# Patient Record
Sex: MALE | Race: WHITE
[De-identification: names, ages, dates, MRNs, and addresses within clinical notes are randomized per-mention and may not be internally consistent; named-entity substitution may affect disease eponyms.]

---

## 2022-01-07 ENCOUNTER — HOSPITAL ENCOUNTER (EMERGENCY)
Dept: HOSPITAL 56 - MW.ED | Age: 57
Discharge: NURSING, REHAB OR LONG-TERM CARE | End: 2022-01-07
Payer: SELF-PAY

## 2022-01-07 DIAGNOSIS — E87.2: ICD-10-CM

## 2022-01-07 DIAGNOSIS — N19: ICD-10-CM

## 2022-01-07 DIAGNOSIS — K92.2: Primary | ICD-10-CM

## 2022-01-07 DIAGNOSIS — R79.89: ICD-10-CM

## 2022-01-07 DIAGNOSIS — R57.9: ICD-10-CM

## 2022-01-07 DIAGNOSIS — Z20.822: ICD-10-CM

## 2022-01-07 LAB
APAP SERPL-MCNC: <2 UG/ML
BUN SERPL-MCNC: 151 MG/DL (ref 7–18)
CHLORIDE SERPL-SCNC: 96 MMOL/L (ref 98–107)
CO2 SERPL-SCNC: 6.4 MMOL/L (ref 21–32)
FLUAV RNA UPPER RESP QL NAA+PROBE: NEGATIVE
FLUBV RNA UPPER RESP QL NAA+PROBE: NEGATIVE
GLUCOSE SERPL-MCNC: 137 MG/DL (ref 74–106)
POTASSIUM SERPL-SCNC: 7.7 MMOL/L (ref 3.5–5.1)
SARS-COV-2 RNA RESP QL NAA+PROBE: NEGATIVE
SODIUM SERPL-SCNC: 130 MMOL/L (ref 136–148)

## 2022-01-07 PROCEDURE — 85610 PROTHROMBIN TIME: CPT

## 2022-01-07 PROCEDURE — 85730 THROMBOPLASTIN TIME PARTIAL: CPT

## 2022-01-07 PROCEDURE — 99292 CRITICAL CARE ADDL 30 MIN: CPT

## 2022-01-07 PROCEDURE — 87040 BLOOD CULTURE FOR BACTERIA: CPT

## 2022-01-07 PROCEDURE — 86900 BLOOD TYPING SEROLOGIC ABO: CPT

## 2022-01-07 PROCEDURE — 81001 URINALYSIS AUTO W/SCOPE: CPT

## 2022-01-07 PROCEDURE — 96375 TX/PRO/DX INJ NEW DRUG ADDON: CPT

## 2022-01-07 PROCEDURE — 84132 ASSAY OF SERUM POTASSIUM: CPT

## 2022-01-07 PROCEDURE — 99291 CRITICAL CARE FIRST HOUR: CPT

## 2022-01-07 PROCEDURE — 80143 DRUG ASSAY ACETAMINOPHEN: CPT

## 2022-01-07 PROCEDURE — 36430 TRANSFUSION BLD/BLD COMPNT: CPT

## 2022-01-07 PROCEDURE — 86920 COMPATIBILITY TEST SPIN: CPT

## 2022-01-07 PROCEDURE — 83605 ASSAY OF LACTIC ACID: CPT

## 2022-01-07 PROCEDURE — 96368 THER/DIAG CONCURRENT INF: CPT

## 2022-01-07 PROCEDURE — 93005 ELECTROCARDIOGRAM TRACING: CPT

## 2022-01-07 PROCEDURE — 31500 INSERT EMERGENCY AIRWAY: CPT

## 2022-01-07 PROCEDURE — P9017 PLASMA 1 DONOR FRZ W/IN 8 HR: HCPCS

## 2022-01-07 PROCEDURE — 82803 BLOOD GASES ANY COMBINATION: CPT

## 2022-01-07 PROCEDURE — 36415 COLL VENOUS BLD VENIPUNCTURE: CPT

## 2022-01-07 PROCEDURE — 80307 DRUG TEST PRSMV CHEM ANLYZR: CPT

## 2022-01-07 PROCEDURE — 84484 ASSAY OF TROPONIN QUANT: CPT

## 2022-01-07 PROCEDURE — 96365 THER/PROPH/DIAG IV INF INIT: CPT

## 2022-01-07 PROCEDURE — 85014 HEMATOCRIT: CPT

## 2022-01-07 PROCEDURE — 80305 DRUG TEST PRSMV DIR OPT OBS: CPT

## 2022-01-07 PROCEDURE — 80053 COMPREHEN METABOLIC PANEL: CPT

## 2022-01-07 PROCEDURE — 0240U: CPT

## 2022-01-07 PROCEDURE — P9016 RBC LEUKOCYTES REDUCED: HCPCS

## 2022-01-07 PROCEDURE — 83735 ASSAY OF MAGNESIUM: CPT

## 2022-01-07 PROCEDURE — 85018 HEMOGLOBIN: CPT

## 2022-01-07 PROCEDURE — 86901 BLOOD TYPING SEROLOGIC RH(D): CPT

## 2022-01-07 PROCEDURE — 84443 ASSAY THYROID STIM HORMONE: CPT

## 2022-01-07 PROCEDURE — 86850 RBC ANTIBODY SCREEN: CPT

## 2022-01-07 PROCEDURE — 96366 THER/PROPH/DIAG IV INF ADDON: CPT

## 2022-01-07 PROCEDURE — C9113 INJ PANTOPRAZOLE SODIUM, VIA: HCPCS

## 2022-01-07 PROCEDURE — 80179 DRUG ASSAY SALICYLATE: CPT

## 2022-01-07 PROCEDURE — 36556 INSERT NON-TUNNEL CV CATH: CPT

## 2022-01-07 PROCEDURE — 96367 TX/PROPH/DG ADDL SEQ IV INF: CPT

## 2022-01-07 PROCEDURE — 82947 ASSAY GLUCOSE BLOOD QUANT: CPT

## 2022-01-07 PROCEDURE — 96376 TX/PRO/DX INJ SAME DRUG ADON: CPT

## 2022-01-07 PROCEDURE — 85025 COMPLETE CBC W/AUTO DIFF WBC: CPT

## 2022-01-07 PROCEDURE — 71045 X-RAY EXAM CHEST 1 VIEW: CPT

## 2022-01-07 PROCEDURE — 36600 WITHDRAWAL OF ARTERIAL BLOOD: CPT

## 2022-01-07 RX ADMIN — SUCCINYLCHOLINE CHLORIDE STA MG: 20 INJECTION, SOLUTION INTRAMUSCULAR; INTRAVENOUS at 17:15

## 2022-01-07 RX ADMIN — OCTREOTIDE ACETATE ONE MCG: 50 INJECTION, SOLUTION INTRAVENOUS; SUBCUTANEOUS at 18:35

## 2022-01-07 RX ADMIN — OCTREOTIDE ACETATE ONE: 50 INJECTION, SOLUTION INTRAVENOUS; SUBCUTANEOUS at 18:26

## 2022-01-07 RX ADMIN — SUCCINYLCHOLINE CHLORIDE STA MG: 20 INJECTION, SOLUTION INTRAMUSCULAR; INTRAVENOUS at 20:58

## 2022-01-07 NOTE — CR
INDICATION: Flight crew wanted intubation checked



TECHNIQUE: Chest radiograph 1 view



COMPARISON: 01/07/2022



FINDINGS: 



Mediastinum: The mediastinum is normal in appearance. The heart silhouette 

is normal in size and morphology. The endotracheal tube tip is positioned 

6.8 cm from the dioni. NG tube is present with the tip in the gastric 

fundus. 



Lung: Consolidation in the right lower lung zone and small right pleural 

effusion is present without significant change. No pneumothorax is 

identified. 



Bone and Soft tissue: Unremarkable for age. 



IMPRESSION: 



1. There has been no significant interval changes.



Dictated by Dwight Genao MD @ 1/7/2022 10:30:58 PM



Dictated by: Dwight Genao MD @ 01/07/2022 22:31:01



(Electronically Signed)

## 2022-01-07 NOTE — EDM.PDOC
<ErrolrobertyvonneJitendra A - Last Filed: 01/07/22 18:45>





ED HPI GENERAL MEDICAL PROBLEM





- General


Chief Complaint: General


Stated Complaint: EMS ARRIVAL


Time Seen by Provider: 01/07/22 17:04


Source of Information: Reports: EMS


History Limitations: Reports: Altered Mental Status





- History of Present Illness


INITIAL COMMENTS - FREE TEXT/NARRATIVE: 





56-year-old male past medical history alcohol abuse presents for unresponsive 

state.  History is from EMS as patient is unable to provide history secondary to

clinical condition.  Someone had called a welfare check on the patient and when 

police arrived at the patient's apartment he was noted to be lying on the ground

unresponsive.  EMS was called and did do an EKG which showed a normal sinus 

rhythm as well as a fingerstick glucose which was in normal limits.  Patient was

noted to have normal blood pressure and pulse.  He was with agonal respirations 

and pinpoint pupils.  2 mg of Narcan was given IV without any response.  Patient

brought to the emergency department for further work-up and assessment.





- Related Data


                                    Allergies











Allergy/AdvReac Type Severity Reaction Status Date / Time


 


No Known Allergies Allergy   Verified 01/07/22 18:54











Home Meds: 


                                    Home Meds





. [No Known Home Meds]  08/19/15 [History]











Past Medical History


Other Psychiatric History: Etoh abuse/DT's.





ED ROS GENERAL





- Review of Systems


Review Of Systems: Unable To Obtain


Reason Not Obtained: Unresponsive state





ED EXAM, GENERAL





- Physical Exam


Exam: See Below


Exam Limited By: Other (Unresponsive state)


General Appearance: Other (GCS 6, withdrawals to pain, otherwise non-verbal, 

does not follow commands, appears chronically ill)


Eye Exam: Bilateral Eye: Other (pupils b/l pinpoint)


Ears: Normal External Exam


Throat/Mouth: No Airway Compromise, Other (dry oropharynx)


Head: Atraumatic, Normocephalic


Neck: Normal Inspection


Respiratory/Chest: Other (agonal breathing, otherwise lungs CTAB)


Cardiovascular: Other (difficult to palpate radial pulses b/l; palpable femoral 

pulses)


GI/Abdominal: Soft.  No: Guarding


Extremities: Normal Inspection


Neurological: Other (GCS6)


Skin Exam: Dry, Intact, Cool





ED GENERAL MEDICAL PROCEDURES





- Endotracheal Intubation


Time of Intubation: 17:00


ET Intubation Indication: Airway Protection


Preparation: Suction, Balloon Tested, BVM Set Up, Difficult Airway Equip


Pre-Oxygenation: Assisted with BVM


Anesthesia Meds: Etomidate, Succinylcholine


Placement: Orotracheal


Cords Visualized: Yes


ETT Size In mm: 7.5


Number of Attempts: 2


Confirmed By: CO2 Indicator, Bilateral Breath Sounds, Chest Xray


Tube Secured By: By RT


Endotracheal Intubation Comment: 





Intubation was complicated by very dry oropharynx.


  ** #1 Interpretation


EKG Date: 01/07/22


Time: 16:50


Rhythm: Other (junctional rhythm)


Axis: Normal


P-Wave: Absent


QRS: Normal


ST-T: Normal


QT: Prolonged (517)


EKG Interpretation Comments: 





no acute ischemic changes





Course





- Re-Assessments/Exams


Free Text/Narrative Re-Assessment/Exam: 





01/07/22 17:45


Patient presents unresponsive.  He does have history of alcohol abuse and 

alcohol withdrawal seizures.  Patient is intubated for airway protection.  

Propofol drip has been ordered.  We'll follow up extensive labs and imaging.


01/07/22 18:23


I did get a phone call from the patient's wife Ms. Nina Pena 

([phone]); she notes that she has not seen the patient in several days.  

She believes that the patient has been sick for the last couple of days and 

notes that he was taking time off from work.  She states the patient's employer 

has been bringing him food for the past few days.  She believes that the last 

time that he was seen was yesterday when patient employer brought him food.  The

 patient's employer could not get a hold of him today prompting him to call 

police for a welfare check leading to him being brought to the hospital this 

afternoon.  The patient's wife would like to be called with updates.  She has 

been informed the patient is very ill and currently intubated.


01/07/22 18:36


OG tube was placed with aspiration of roughly 500cc of dark red blood. Patient's

 ABG show severe metabolic acidosis; sodium bicarbonate push and drip ordered. 

Ceftriaxone, octreotide, protonix ordered. 2-L IVFB ordered. Type and screen 

ordered.  


01/07/22 18:45


I have called [facility] and [facility] which are both on full-

house diversion. I am on hold with [facility]. 





Departure





- Departure


Disposition: DC/Tfer to [facility]


Clinical Impression: 


 Shock, GI bleed, Renal failure








- Discharge Information


Referrals: 


PCP,None [Primary Care Provider] - 


Forms:  ED Department Discharge





<RustamCuong C - Last Filed: 01/07/22 22:21>





ED HPI GENERAL MEDICAL PROBLEM





- History of Present Illness


INITIAL COMMENTS - FREE TEXT/NARRATIVE: 


Patient was signed out to me by Dr. Chapman pending further work-up and final 

disposition at 7pm. I promptly performed a detailed physical examination and my 

examination was done after ED treatments were initiated by the signout provider.

Patient has been under the care of previous provider up until this point. 





In short this is a 56-year-old man with a past medical history of alcohol use 

disorder and prior history of alcohol withdrawal seizures who was found agonal 

he breathing at his home after a welfare check.  Upon arrival in the emergency 

department the patient was intubated secondary to decreased GCS and for airway 

protection.  When OG tube was placed approximately 500 cc of coffee-ground 

emesis/bloody emesis was removed.  It was difficult for them to obtain any kind 

of pulse oximetry so they obtained an ABG which did reveal metabolic acidosis 

with bicarbonate of 5 and a pH of 6.98.  PaO2 at that time was 472 therefore 

oxygen was weaned down.








EXAMINATION OF ORGAN SYSTEMS/BODY AREAS: 





Constitutional: Hypotensive, bradycardiac.  Pulse oximetry cannot be obtained


General: Middle-aged man who appears pale and is intubated and sedated.


Eyes: No scleral icterus or conjunctival erythema pale conjunctiva. Pupils were 

2mm bilaterally. 


ENMT: Dry mucous membranes with OG and endotracheal tube in place.


Cardiovascular: Bradycardic no gallops, murmurs, or rubs.  Central pulses could 

be palpated.  They were strong


Respiratory: Lungs clear to auscultation bilaterally.  No wheezes, rales, or 

rhonchi.  Ventilated.


Gastrointestinal: Soft, non-tender, non-distended.  Normoactive bowel sounds


Genitourinary: Mistry catheter in place.  No urinary output musculoskeletal: 

Normal range of motion.


Skin: The skin is pale with peripheral cyanosis


Neurological:     Difficult to assess secondary to intubation and sedation





The patient has already been provided 2 L of normal saline, 80 mg of IV 

Protonix, and started on a sodium bicarb drip.  The patient's MAP is less than 

65.  Therefore at this time given shock will place a central line emergently.  

Given the amount of bleeding from OG tube I did order 3 units of PRBCs, 1 of FFP

, and provided the patient with 1 g of TXA.  In addition at this time it is 

uncertain if the patient has a gastritis versus esophageal varices therefore we 

will start the patient on an octreotide drip.  Consent was implied emergent.





Central Line Procedure 





The risks and benefits of the procedure were explained. Consent was implied 

emergent. The indication for central line placement was hypotension/need for 

vasopressors. The patient was monitored during the entire procedure. Time out 

was performed. The patient's left groin was prepped and draped in a sterile 

fashion. Lidocaine was used to anesthetize the area. A needle was entered into 

the femoral vein under landmark palpation with return of non-pulsatile flow. 

Guidewire was placed and triple lumen catheter was placed via Seldinger techni

que. Guidewire was removed. All 3 lines easily withdrew blood and were flushed 

with sterile saline. A Biopatch was placed and the line was secured. Patient 

tolerated procedure well. No complications.





After placement of central venous catheter I did order norepinephrine to start 

to keep maps above 65.





Laboratory: CBC reveals a macrocytic anemia with a hemoglobin of 4.0 and 

hematocrit of 13.9.  There is a leukocytosis with a white blood cell count of 

16.2 with 15.7 segmented neutrophils.  INR is mildly elevated at 1.62 and APTT 

is 37.9.  CMP reveals anion gap metabolic acidosis with a bicarbonate of 6.4, 

hyponatremia at 130, hypochloremia at 96, hyperkalemia without hemolysis at 7.7.

 Acute renal failure with a BUN of 151 and a creatinine of 9.3.  Hyperglycemia 

at 137.  There is a transaminitis with an AST of 262, ALT of 204 and alkaline 

phosphatase of 415.  Hypoalbuminemia at 1.8.  TSH is 5.61.  Troponin is elevated

0.447.  Urinalysis was negative.  UDS and serum drug screen were negative.  

Covid and influenza are negative.





Given the acute renal failure and hyperkalemia I did obtain a repeat EKG.  There

was no evidence of any peaked T waves however there was QRS widening.  Therefore

at this time we will provide the patient with calcium gluconate, insulin, 

dextrose, albuterol, and Kayexalate.  We will reevaluate after this treatment.  

The patient is already on bicarbonate drip.  In addition Lasix is not indicated 

at this time given the patient has had 0 urinary output since being in the 

emergency department.  We will start the patient on lactated Ringer's at 150 

cc/h for maintenance fluids.  In addition given the mild elevation in INR we did

provide the patient with vitamin K 10 mg IV.





At this time I did contact the patient's wife and discussed CODE STATUS with her

and an update on the patient.  At this time the patient is full code.  I did 

discuss that I would update her on where the patient will be transferred to.





We contacted [facility] in [place], [facility] in [place], [facility], [facility], [facility], [facility], [facility] all

of which do not have any capability.  Therefore at this time we did speak to 

[facility] and I spoke with Dr. Tyson who accepted the patient for 

transfer.





At the time of transfer the patient's vital signs had improved and the patient 

no longer required any further norepinephrine dosing.  The patient's map was 

greater than 65, and pulse oximetry with good waveform was 93 to 95% on FiO2 of 

40%.  Given the hyperkalemia we did repeat an EKG which continued to show a 

widened QRS however there was no longer bradycardia.  Therefore we provided an 

additional dose of insulin and glucose, albuterol, and calcium gluconate.  We 

did obtain repeat labs prior to discharge and transfer with a hemoglobin showing

increased to 6.3 after 2 units and a potassium that is decreased down to 7.0.





DISPOSITION: The patient was transferred to [facility] in critical 

condition 





CONDITION: Critical





PROCEDURES: Cardiac monitoring interpretation, pulse oximetry interpretation, 

central line placement





FINAL IMPRESSION(S)/DIAGNOSES: 





1.  Acute hemorrhagic versus septic shock


2.  Acute anion gap metabolic acidosis likely secondary to #1


3.  Acute renal failure likely secondary to #1


4.  Acute elevated troponin likely secondary to type II demand ischemia 

secondary to #1 and #3


5.  Acute GI bleed likely secondary to upper GI bleed versus esophageal variceal

bleeding





Critical Care Procedure Note





Authorized and performed by: Cuong Easton M.D. 


Critical Care Time: 180 minutes


Due to a high probability of clinically significant, life threatening 

deterioration, the patient required my highest level of preparedness to 

intervene emergently and I personally spent this critical care time directly and

personally managing the patient. This critical care time included obtaining a 

history, examining the patient, pulse oximetry; ordering and review of studies; 

arranging urgent treatment with development of a management plan; evaluation of 

a patients reponse to treatment; frequent assessment; and discussions with other

providers. This critical care time was performed to assess and manage the high 

probability of imminent, life threatening deterioration that could result in 

multiorgan failure. It was exclusive of separate billable procedures and 

treating other patients. 





Please see MDM section and rest of the note for further information on patient 

assessment and treatment.





Please see MDM section and rest of the note for further information on patient 

assessment and treatment.





 





Cuong Easton M.D.








Course





- Vital Signs


Last Recorded V/S: 





                                Last Vital Signs











Temp  36.2 C   01/07/22 18:49


 


Pulse  61   01/07/22 18:49


 


Resp  26 H  01/07/22 18:49


 


BP  98/47 L  01/07/22 18:49


 


Pulse Ox  81 L  01/07/22 18:49














- Orders/Labs/Meds


Orders: 





                               Active Orders 24 hr











 Category Date Time Status


 


 Cardiac Monitoring [RC] .AS DIRECTED Care  01/07/22 17:04 Active


 


 Insert Mistry Catheter [Insert Urinary Catheter] [OM.PC] Care  01/07/22 17:45 

Ordered





 Q24H   


 


 RASS Sedation Scale [RC] ASDIRECTED Care  01/07/22 17:33 Active


 


 RASS Sedation Scale [RC] ASDIRECTED Care  01/07/22 17:37 Active


 


 RT Aerosol Therapy [RC] ASDIRECTED Care  01/07/22 20:01 Active


 


 RT Aerosol Therapy [RC] ASDIRECTED Care  01/07/22 21:47 Active


 


 Urinary Catheter Assessment [RC] ASDIRECTED Care  01/07/22 17:31 Active


 


 Ventilator Assessment, ED [RT Ventilator ED, Adult] [RC Care  01/07/22 17:15 

Active





 ] ASDIRECTED   


 


 Chest 1V Frontal [CR] Stat Exams  01/07/22 22:06 Ordered


 


 CULTURE BLOOD [BC] Stat Lab  01/07/22 18:38 Results


 


 CULTURE BLOOD [BC] Stat Lab  01/07/22 18:50 Results


 


 FRESH FROZEN PLASMA [BBK] Stat Lab  01/07/22 18:55 Results


 


 RED BLOOD CELLS LP [BBK] Stat Lab  01/07/22 18:55 Results


 


 REFLEX LACTIC ACID YES OR NO [CHEM] Routine Lab  01/07/22 19:55 Received


 


 TYPE AND SCREEN [BBK] Stat Lab  01/07/22 18:55 Results


 


 VANCOMYCIN TROUGH [CHEM] Timed Lab  01/08/22 21:00 Ordered


 


 Dextrose 50% in Water Med  01/07/22 20:00 Active





 50 ml IV ASDIRECTED PRN   


 


 Dextrose 50% in Water Med  01/07/22 20:01 Active





 50 ml IVPUSH ASDIRECTED PRN   


 


 Dextrose 50% in Water Med  01/07/22 21:55 Active





 50 ml IVPUSH ASDIRECTED PRN   


 


 Glucagon,Human Recombinant [GlucaGen] Med  01/07/22 20:01 Active





 1 mg IM ASDIRECTED PRN   


 


 Glucagon,Human Recombinant [GlucaGen] Med  01/07/22 21:55 Active





 1 mg IM ASDIRECTED PRN   


 


 Lactated Ringers [Ringers, Lactated] 1,000 ml Med  01/07/22 21:18 Active





 IV STAT   


 


 Norepinephrine Bit/0.9 % NaCl [Norepinephr-0.9% NaCl 4 Med  01/07/22 20:00 

Active





 mg/250]   





 4 mg in 250 ml IV TITRATE   


 


 Octreotide [SandoSTATIN] 500 mcg Med  01/07/22 19:15 Active





 Sodium Chloride 0.9% [Normal Saline] 495 ml   





 IV Q10H   


 


 Pharmacy to Dose - Vancomycin Med  01/07/22 20:04 Pending





 1 dose .XX ONETIME ONE   


 


 Sodium Bicarbonate [Sodium Bicarbonate 8.4%] 150 meq Med  01/07/22 19:30 Active





 Dextrose 5% in Water 1,000 ml   





 IV ONETIME   


 


 Sodium Chloride 0.9% [Saline Flush] Med  01/07/22 17:04 Active





 10 ml FLUSH ASDIRECTED PRN   


 


 Sodium Chloride 0.9% [Saline Flush] Med  01/07/22 17:04 Active





 2.5 ml FLUSH ASDIRECTED PRN   


 


 VANCOmycin 2 GM/400 ML 2 gm Med  01/07/22 21:00 Active





 Premix Bag 1 bag   





 IV STAT   


 


 fentaNYL/Normal Saline [fentaNYL 2500 MCG in  ML Med  01/07/22 19:10 

Active





 (10 MCG/ML)] 2,500 mcg   





 Premix Bag 1 bag   





 IV TITRATE   


 


 propofoL [Diprivan 100 ML] 100 ml Med  01/07/22 17:45 Active





 IV TITRATE   


 


 Blood Culture x2 Reflex Set [OM.PC] Stat Oth  01/07/22 17:21 Ordered


 


 Desired Level of Sedation (RASS) [AST] Click to Edit [facility]  01/07/22 17:33 

Ordered


 


 Desired Level of Sedation (RASS) [AST] Click to Edit [facility]  01/07/22 17:37 

Ordered


 


 Saline Lock Insert [OM.PC] Stat Oth  01/07/22 17:04 Ordered


 


 Transfuse Fresh Frozen Plasma [COMM] Stat [facility]  01/07/22 19:53 Ordered


 


 Transfuse Red Blood Cells [COMM] Stat [facility]  01/07/22 19:12 Ordered


 


 Transfuse Red Blood Cells [COMM] Stat [facility]  01/07/22 19:57 Ordered








                                Medication Orders





Dextrose/Water (50% Dextrose In Water 50 Ml Syringe)  50 ml IV ASDIRECTED PRN


   PRN Reason: Hypoglycemia


Dextrose/Water (50% Dextrose In Water 50 Ml Syringe)  50 ml IVPUSH ASDIRECTED 

PRN


   PRN Reason: Hypoglycemia


Dextrose/Water (50% Dextrose In Water 50 Ml Syringe)  50 ml IVPUSH ASDIRECTED 

PRN


   PRN Reason: Hypoglycemia


Glucagon (Glucagon,Human Recombinant 1 Mg Vial)  1 mg IM ASDIRECTED PRN


   PRN Reason: Hypoglycemia


Glucagon (Glucagon,Human Recombinant 1 Mg Vial)  1 mg IM ASDIRECTED PRN


   PRN Reason: Hypoglycemia


Propofol (Diprivan 100 Ml)  100 mls @ 2.4 mls/hr IV TITRATE [facility]; Protocol


   Last Titration: 01/07/22 17:50  Dose: 20 mcg/kg/min, 9.6 mls/hr


   Documented by: RODRIGO


   Admin: 01/07/22 17:45  Dose: 5 mcg/kg/min, 2.4 mls/hr


   Documented by: MELODY


Octreotide Acetate 500 mcg/ (Sodium Chloride)  500 mls @ 50 mls/hr IV Q10H [facility]IVE


   Last Admin: 01/07/22 21:39  Dose: 50 mcg/hr, 50 mls/hr


   Documented by: STRUALLEANN


Fentanyl Citrate 2,500 mcg/ (Premix)  250 mls @ 2.5 mls/hr IV TITRATE PRN; 

Protocol


   PRN Reason: Other


Sodium Bicarbonate 150 meq/ (Dextrose/Water)  1,150 mls @ 200 mls/hr IV ONETIME 

ONE


   Stop: 01/08/22 00:21


   Last Admin: 01/07/22 20:20  Dose: 200 mls/hr


   Documented by: RODRIGO


Norepinephrine Bitartrate (Norepinephr-0.9% Nacl 4 Mg/250)  4 mg in 250 mls @ 

7.5 mls/hr IV TITRATE CLIVE; Protocol


Vancomycin HCl 2 gm/ Premix  400 mls @ 200 mls/hr IV STAT ONE


   Stop: 01/07/22 22:59


Lactated Ringer's (Ringers, Lactated)  1,000 mls @ 150 mls/hr IV STAT STA


   Stop: 01/08/22 03:57


   Last Admin: 01/07/22 22:05  Dose: 150 mls/hr


   Documented by: RODRIGO


Sodium Chloride (Sodium Chloride 0.9% 10 Ml Syringe)  10 ml FLUSH ASDIRECTED PRN


   PRN Reason: Keep Vein Open


   Last Admin: 01/07/22 19:03  Dose: 10 ml


   Documented by: RODRIGO


Sodium Chloride (Sodium Chloride 0.9% 2.5 Ml Syringe)  2.5 ml FLUSH ASDIRECTED 

PRN


   PRN Reason: Keep Vein Open


   Last Admin: 01/07/22 19:03  Dose: 2.5 ml


   Documented by: RODRIGO


Vancomycin HCl (Pharmacy To Dose - Vancomycin)  1 dose .XX ONETIME ONE


   Stop: 01/07/22 20:05








Labs: 





                                Laboratory Tests











  01/07/22 01/07/22 01/07/22 Range/Units





  17:33 17:33 18:05 


 


WBC     (4.0-11.0)  K/uL


 


RBC     (4.50-5.90)  M/uL


 


Hgb     (13.0-17.0)  g/dL


 


Hct     (38.0-50.0)  %


 


MCV     (80.0-98.0)  fL


 


MCH     (27.0-32.0)  pg


 


MCHC     (31.0-37.0)  g/dL


 


RDW Std Deviation     (28.0-62.0)  fl


 


RDW Coeff of Gabriela     (11.0-15.0)  %


 


Plt Count     (150-400)  K/uL


 


MPV     (7.40-12.00)  fL


 


Add Manual Diff     


 


Neutrophils % (Manual)     (48.0-80.0)  %


 


Lymphocytes % (Manual)     (16.0-40.0)  %


 


Monocytes % (Manual)     (0.0-15.0)  %


 


Nucleated RBC %     /100WBC


 


Absolute Seg Neuts     (1.4-5.7)  


 


Lymphocytes # (Manual)     (0.6-2.4)  


 


Monocytes # (Manual)     (0.0-0.8)  


 


Nucleated RBCs     %


 


Nucleated RBCs #     K/uL


 


INR     


 


APTT     (18.6-31.3)  SEC


 


ABG pH     (7.35-7.45)  


 


ABG pCO2     (35-45)  mmHG


 


ABG pO2     ()  mmHG


 


ABG HCO3     (22-26)  mEq/L


 


ABG Total CO2     (23-27)  mmol/L


 


ABG Base Excess     (-2.0-3.0)  


 


Sodium     (136-148)  mmol/L


 


Potassium     (3.5-5.1)  mmol/L


 


Chloride     ()  mmol/L


 


Carbon Dioxide     (21.0-32.0)  mmol/L


 


BUN     (7.0-18.0)  mg/dL


 


Creatinine     (0.8-1.3)  mg/dL


 


Est Cr Clr Drug Dosing     


 


Estimated GFR (MDRD)     ml/min


 


Glucose     ()  mg/dL


 


POC Glucose     (70-99)  mg/dL


 


Lactic Acid     (0.4-2.0)  mmol/L


 


Calcium     (8.5-10.1)  mg/dL


 


Magnesium     (1.8-2.4)  mg/dL


 


Total Bilirubin     (0.2-1.0)  mg/dL


 


AST     (15-37)  IU/L


 


ALT     (14-63)  IU/L


 


Alkaline Phosphatase     ()  U/L


 


Troponin I     (0.000-0.056)  ng/mL


 


Total Protein     (6.4-8.2)  g/dL


 


Albumin     (3.4-5.0)  g/dL


 


Globulin     (2.6-4.0)  g/dL


 


Albumin/Globulin Ratio     (0.9-1.6)  


 


TSH, Ultra Sensitive     (0.36-3.74)  uIU/mL


 


Urine Color  YELLOW    


 


Urine Appearance  CLEAR    


 


Urine pH  5.5    (5.0-8.0)  


 


Ur Specific Gravity  1.025    (1.001-1.035)  


 


Urine Protein  30 H    (NEGATIVE)  mg/dL


 


Urine Glucose (UA)  NEGATIVE    (NEGATIVE)  mg/dL


 


Urine Ketones  NEGATIVE    (NEGATIVE)  mg/dL


 


Urine Occult Blood  NEGATIVE    (NEGATIVE)  


 


Urine Nitrite  NEGATIVE    (NEGATIVE)  


 


Urine Bilirubin  NEGATIVE    (NEGATIVE)  


 


Urine Urobilinogen  0.2    (<2.0)  EU/dL


 


Ur Leukocyte Esterase  NEGATIVE    (NEGATIVE)  


 


Urine RBC  0-2    (0-2/HPF)  


 


Urine WBC  0-2    (0-5/HPF)  


 


Ur Epithelial Cells  FEW    (NONE-FEW)  


 


Urine Bacteria  RARE    (NEGATIVE)  


 


Salicylates     (0-20)  mg/dL


 


Urine Opiates Screen   NEGATIVE   (NEGATIVE)  


 


Ur Oxycodone Screen   NEGATIVE   (NEGATIVE)  


 


Urine Methadone Screen   NEGATIVE   (NEGATIVE)  


 


Acetaminophen     ug/mL


 


Ur Barbiturates Screen   NEGATIVE   (NEGATIVE)  


 


Ur Phencyclidine Scrn   NEGATIVE   (NEGATIVE)  


 


Ur Amphetamine Screen   NEGATIVE   (NEGATIVE)  


 


U Methamphetamines Scrn   NEGATIVE   (NEGATIVE)  


 


U Benzodiazepines Scrn   NEGATIVE   (NEGATIVE)  


 


U Cocaine Metab Screen   NEGATIVE   (NEGATIVE)  


 


U Marijuana (THC) Screen   NEGATIVE   (NEGATIVE)  


 


Ethyl Alcohol     mg/dL


 


Influenza Type A RNA    NEGATIVE  (NEGATIVE)  


 


Influenza Type B RNA    NEGATIVE  (NEGATIVE)  


 


SARS-CoV-2 RNA (GABRIEL)    NEGATIVE  (NEGATIVE)  


 


Blood Type     


 


Antibody Screen     


 


Crossmatch     














  01/07/22 01/07/22 01/07/22 Range/Units





  18:11 18:38 18:38 


 


WBC   16.70 H   (4.0-11.0)  K/uL


 


RBC   1.38 L   (4.50-5.90)  M/uL


 


Hgb   4.0 L*   (13.0-17.0)  g/dL


 


Hct   13.9 L   (38.0-50.0)  %


 


MCV   100.7 H   (80.0-98.0)  fL


 


MCH   29.0   (27.0-32.0)  pg


 


MCHC   28.8 L   (31.0-37.0)  g/dL


 


RDW Std Deviation   69.6 H   (28.0-62.0)  fl


 


RDW Coeff of Gabriela   20 H   (11.0-15.0)  %


 


Plt Count   229   (150-400)  K/uL


 


MPV   11.00   (7.40-12.00)  fL


 


Add Manual Diff   YES   


 


Neutrophils % (Manual)   94 H   (48.0-80.0)  %


 


Lymphocytes % (Manual)   5 L   (16.0-40.0)  %


 


Monocytes % (Manual)   1   (0.0-15.0)  %


 


Nucleated RBC %   6.4   /100WBC


 


Absolute Seg Neuts   15.7 H   (1.4-5.7)  


 


Lymphocytes # (Manual)   0.8   (0.6-2.4)  


 


Monocytes # (Manual)   0.2   (0.0-0.8)  


 


Nucleated RBCs   10   %


 


Nucleated RBCs #   1   K/uL


 


INR     


 


APTT     (18.6-31.3)  SEC


 


ABG pH  6.98 L*    (7.35-7.45)  


 


ABG pCO2  19 L    (35-45)  mmHG


 


ABG pO2  472 H    ()  mmHG


 


ABG HCO3  5 L    (22-26)  mEq/L


 


ABG Total CO2  6 L    (23-27)  mmol/L


 


ABG Base Excess  -24.5 L    (-2.0-3.0)  


 


Sodium     (136-148)  mmol/L


 


Potassium     (3.5-5.1)  mmol/L


 


Chloride     ()  mmol/L


 


Carbon Dioxide     (21.0-32.0)  mmol/L


 


BUN     (7.0-18.0)  mg/dL


 


Creatinine     (0.8-1.3)  mg/dL


 


Est Cr Clr Drug Dosing     


 


Estimated GFR (MDRD)     ml/min


 


Glucose     ()  mg/dL


 


POC Glucose     (70-99)  mg/dL


 


Lactic Acid    13.4 H*  (0.4-2.0)  mmol/L


 


Calcium     (8.5-10.1)  mg/dL


 


Magnesium     (1.8-2.4)  mg/dL


 


Total Bilirubin     (0.2-1.0)  mg/dL


 


AST     (15-37)  IU/L


 


ALT     (14-63)  IU/L


 


Alkaline Phosphatase     ()  U/L


 


Troponin I     (0.000-0.056)  ng/mL


 


Total Protein     (6.4-8.2)  g/dL


 


Albumin     (3.4-5.0)  g/dL


 


Globulin     (2.6-4.0)  g/dL


 


Albumin/Globulin Ratio     (0.9-1.6)  


 


TSH, Ultra Sensitive     (0.36-3.74)  uIU/mL


 


Urine Color     


 


Urine Appearance     


 


Urine pH     (5.0-8.0)  


 


Ur Specific Gravity     (1.001-1.035)  


 


Urine Protein     (NEGATIVE)  mg/dL


 


Urine Glucose (UA)     (NEGATIVE)  mg/dL


 


Urine Ketones     (NEGATIVE)  mg/dL


 


Urine Occult Blood     (NEGATIVE)  


 


Urine Nitrite     (NEGATIVE)  


 


Urine Bilirubin     (NEGATIVE)  


 


Urine Urobilinogen     (<2.0)  EU/dL


 


Ur Leukocyte Esterase     (NEGATIVE)  


 


Urine RBC     (0-2/HPF)  


 


Urine WBC     (0-5/HPF)  


 


Ur Epithelial Cells     (NONE-FEW)  


 


Urine Bacteria     (NEGATIVE)  


 


Salicylates     (0-20)  mg/dL


 


Urine Opiates Screen     (NEGATIVE)  


 


Ur Oxycodone Screen     (NEGATIVE)  


 


Urine Methadone Screen     (NEGATIVE)  


 


Acetaminophen     ug/mL


 


Ur Barbiturates Screen     (NEGATIVE)  


 


Ur Phencyclidine Scrn     (NEGATIVE)  


 


Ur Amphetamine Screen     (NEGATIVE)  


 


U Methamphetamines Scrn     (NEGATIVE)  


 


U Benzodiazepines Scrn     (NEGATIVE)  


 


U Cocaine Metab Screen     (NEGATIVE)  


 


U Marijuana (THC) Screen     (NEGATIVE)  


 


Ethyl Alcohol     mg/dL


 


Influenza Type A RNA     (NEGATIVE)  


 


Influenza Type B RNA     (NEGATIVE)  


 


SARS-CoV-2 RNA (GABRIEL)     (NEGATIVE)  


 


Blood Type     


 


Antibody Screen     


 


Crossmatch     














  01/07/22 01/07/22 01/07/22 Range/Units





  18:38 18:50 18:55 


 


WBC     (4.0-11.0)  K/uL


 


RBC     (4.50-5.90)  M/uL


 


Hgb     (13.0-17.0)  g/dL


 


Hct     (38.0-50.0)  %


 


MCV     (80.0-98.0)  fL


 


MCH     (27.0-32.0)  pg


 


MCHC     (31.0-37.0)  g/dL


 


RDW Std Deviation     (28.0-62.0)  fl


 


RDW Coeff of Gabriela     (11.0-15.0)  %


 


Plt Count     (150-400)  K/uL


 


MPV     (7.40-12.00)  fL


 


Add Manual Diff     


 


Neutrophils % (Manual)     (48.0-80.0)  %


 


Lymphocytes % (Manual)     (16.0-40.0)  %


 


Monocytes % (Manual)     (0.0-15.0)  %


 


Nucleated RBC %     /100WBC


 


Absolute Seg Neuts     (1.4-5.7)  


 


Lymphocytes # (Manual)     (0.6-2.4)  


 


Monocytes # (Manual)     (0.0-0.8)  


 


Nucleated RBCs     %


 


Nucleated RBCs #     K/uL


 


INR  1.62    


 


APTT  37.9 H    (18.6-31.3)  SEC


 


ABG pH     (7.35-7.45)  


 


ABG pCO2     (35-45)  mmHG


 


ABG pO2     ()  mmHG


 


ABG HCO3     (22-26)  mEq/L


 


ABG Total CO2     (23-27)  mmol/L


 


ABG Base Excess     (-2.0-3.0)  


 


Sodium   130 L   (136-148)  mmol/L


 


Potassium   7.7 H*   (3.5-5.1)  mmol/L


 


Chloride   96 L   ()  mmol/L


 


Carbon Dioxide   6.4 L   (21.0-32.0)  mmol/L


 


BUN   151 H   (7.0-18.0)  mg/dL


 


Creatinine   9.3 H   (0.8-1.3)  mg/dL


 


Est Cr Clr Drug Dosing   TNP   


 


Estimated GFR (MDRD)   5.9   ml/min


 


Glucose   137 H   ()  mg/dL


 


POC Glucose     (70-99)  mg/dL


 


Lactic Acid     (0.4-2.0)  mmol/L


 


Calcium   8.6   (8.5-10.1)  mg/dL


 


Magnesium   3.0 H   (1.8-2.4)  mg/dL


 


Total Bilirubin   1.0   (0.2-1.0)  mg/dL


 


AST   262 H   (15-37)  IU/L


 


ALT   204 H   (14-63)  IU/L


 


Alkaline Phosphatase   415 H   ()  U/L


 


Troponin I   0.447 H*   (0.000-0.056)  ng/mL


 


Total Protein   6.4   (6.4-8.2)  g/dL


 


Albumin   1.8 L   (3.4-5.0)  g/dL


 


Globulin   4.6 H   (2.6-4.0)  g/dL


 


Albumin/Globulin Ratio   0.4 L   (0.9-1.6)  


 


TSH, Ultra Sensitive   5.61 H   (0.36-3.74)  uIU/mL


 


Urine Color     


 


Urine Appearance     


 


Urine pH     (5.0-8.0)  


 


Ur Specific Gravity     (1.001-1.035)  


 


Urine Protein     (NEGATIVE)  mg/dL


 


Urine Glucose (UA)     (NEGATIVE)  mg/dL


 


Urine Ketones     (NEGATIVE)  mg/dL


 


Urine Occult Blood     (NEGATIVE)  


 


Urine Nitrite     (NEGATIVE)  


 


Urine Bilirubin     (NEGATIVE)  


 


Urine Urobilinogen     (<2.0)  EU/dL


 


Ur Leukocyte Esterase     (NEGATIVE)  


 


Urine RBC     (0-2/HPF)  


 


Urine WBC     (0-5/HPF)  


 


Ur Epithelial Cells     (NONE-FEW)  


 


Urine Bacteria     (NEGATIVE)  


 


Salicylates   2.6   (0-20)  mg/dL


 


Urine Opiates Screen     (NEGATIVE)  


 


Ur Oxycodone Screen     (NEGATIVE)  


 


Urine Methadone Screen     (NEGATIVE)  


 


Acetaminophen   <2.0   ug/mL


 


Ur Barbiturates Screen     (NEGATIVE)  


 


Ur Phencyclidine Scrn     (NEGATIVE)  


 


Ur Amphetamine Screen     (NEGATIVE)  


 


U Methamphetamines Scrn     (NEGATIVE)  


 


U Benzodiazepines Scrn     (NEGATIVE)  


 


U Cocaine Metab Screen     (NEGATIVE)  


 


U Marijuana (THC) Screen     (NEGATIVE)  


 


Ethyl Alcohol   < 3.0   mg/dL


 


Influenza Type A RNA     (NEGATIVE)  


 


Influenza Type B RNA     (NEGATIVE)  


 


SARS-CoV-2 RNA (GABRIEL)     (NEGATIVE)  


 


Blood Type    O POSITIVE  


 


Antibody Screen    NEGATIVE  


 


Crossmatch    See Detail  














  01/07/22 01/07/22 01/07/22 Range/Units





  20:09 21:18 21:22 


 


WBC     (4.0-11.0)  K/uL


 


RBC     (4.50-5.90)  M/uL


 


Hgb     (13.0-17.0)  g/dL


 


Hct     (38.0-50.0)  %


 


MCV     (80.0-98.0)  fL


 


MCH     (27.0-32.0)  pg


 


MCHC     (31.0-37.0)  g/dL


 


RDW Std Deviation     (28.0-62.0)  fl


 


RDW Coeff of Gabriela     (11.0-15.0)  %


 


Plt Count     (150-400)  K/uL


 


MPV     (7.40-12.00)  fL


 


Add Manual Diff     


 


Neutrophils % (Manual)     (48.0-80.0)  %


 


Lymphocytes % (Manual)     (16.0-40.0)  %


 


Monocytes % (Manual)     (0.0-15.0)  %


 


Nucleated RBC %     /100WBC


 


Absolute Seg Neuts     (1.4-5.7)  


 


Lymphocytes # (Manual)     (0.6-2.4)  


 


Monocytes # (Manual)     (0.0-0.8)  


 


Nucleated RBCs     %


 


Nucleated RBCs #     K/uL


 


INR     


 


APTT     (18.6-31.3)  SEC


 


ABG pH   7.00 L*   (7.35-7.45)  


 


ABG pCO2   25 L   (35-45)  mmHG


 


ABG pO2   125 H   ()  mmHG


 


ABG HCO3   6 L   (22-26)  mEq/L


 


ABG Total CO2   6.5 L   (23-27)  mmol/L


 


ABG Base Excess   -23.3 L   (-2.0-3.0)  


 


Sodium     (136-148)  mmol/L


 


Potassium    7.0 H  (3.5-5.1)  mmol/L


 


Chloride     ()  mmol/L


 


Carbon Dioxide     (21.0-32.0)  mmol/L


 


BUN     (7.0-18.0)  mg/dL


 


Creatinine     (0.8-1.3)  mg/dL


 


Est Cr Clr Drug Dosing     


 


Estimated GFR (MDRD)     ml/min


 


Glucose     ()  mg/dL


 


POC Glucose  124 H    (70-99)  mg/dL


 


Lactic Acid     (0.4-2.0)  mmol/L


 


Calcium     (8.5-10.1)  mg/dL


 


Magnesium     (1.8-2.4)  mg/dL


 


Total Bilirubin     (0.2-1.0)  mg/dL


 


AST     (15-37)  IU/L


 


ALT     (14-63)  IU/L


 


Alkaline Phosphatase     ()  U/L


 


Troponin I     (0.000-0.056)  ng/mL


 


Total Protein     (6.4-8.2)  g/dL


 


Albumin     (3.4-5.0)  g/dL


 


Globulin     (2.6-4.0)  g/dL


 


Albumin/Globulin Ratio     (0.9-1.6)  


 


TSH, Ultra Sensitive     (0.36-3.74)  uIU/mL


 


Urine Color     


 


Urine Appearance     


 


Urine pH     (5.0-8.0)  


 


Ur Specific Gravity     (1.001-1.035)  


 


Urine Protein     (NEGATIVE)  mg/dL


 


Urine Glucose (UA)     (NEGATIVE)  mg/dL


 


Urine Ketones     (NEGATIVE)  mg/dL


 


Urine Occult Blood     (NEGATIVE)  


 


Urine Nitrite     (NEGATIVE)  


 


Urine Bilirubin     (NEGATIVE)  


 


Urine Urobilinogen     (<2.0)  EU/dL


 


Ur Leukocyte Esterase     (NEGATIVE)  


 


Urine RBC     (0-2/HPF)  


 


Urine WBC     (0-5/HPF)  


 


Ur Epithelial Cells     (NONE-FEW)  


 


Urine Bacteria     (NEGATIVE)  


 


Salicylates     (0-20)  mg/dL


 


Urine Opiates Screen     (NEGATIVE)  


 


Ur Oxycodone Screen     (NEGATIVE)  


 


Urine Methadone Screen     (NEGATIVE)  


 


Acetaminophen     ug/mL


 


Ur Barbiturates Screen     (NEGATIVE)  


 


Ur Phencyclidine Scrn     (NEGATIVE)  


 


Ur Amphetamine Screen     (NEGATIVE)  


 


U Methamphetamines Scrn     (NEGATIVE)  


 


U Benzodiazepines Scrn     (NEGATIVE)  


 


U Cocaine Metab Screen     (NEGATIVE)  


 


U Marijuana (THC) Screen     (NEGATIVE)  


 


Ethyl Alcohol     mg/dL


 


Influenza Type A RNA     (NEGATIVE)  


 


Influenza Type B RNA     (NEGATIVE)  


 


SARS-CoV-2 RNA (GABRIEL)     (NEGATIVE)  


 


Blood Type     


 


Antibody Screen     


 


Crossmatch     














  01/07/22 Range/Units





  21:22 


 


WBC   (4.0-11.0)  K/uL


 


RBC   (4.50-5.90)  M/uL


 


Hgb  6.3 L  (13.0-17.0)  g/dL


 


Hct  20.5 L  (38.0-50.0)  %


 


MCV   (80.0-98.0)  fL


 


MCH   (27.0-32.0)  pg


 


MCHC   (31.0-37.0)  g/dL


 


RDW Std Deviation   (28.0-62.0)  fl


 


RDW Coeff of Gabriela   (11.0-15.0)  %


 


Plt Count   (150-400)  K/uL


 


MPV   (7.40-12.00)  fL


 


Add Manual Diff   


 


Neutrophils % (Manual)   (48.0-80.0)  %


 


Lymphocytes % (Manual)   (16.0-40.0)  %


 


Monocytes % (Manual)   (0.0-15.0)  %


 


Nucleated RBC %   /100WBC


 


Absolute Seg Neuts   (1.4-5.7)  


 


Lymphocytes # (Manual)   (0.6-2.4)  


 


Monocytes # (Manual)   (0.0-0.8)  


 


Nucleated RBCs   %


 


Nucleated RBCs #   K/uL


 


INR   


 


APTT   (18.6-31.3)  SEC


 


ABG pH   (7.35-7.45)  


 


ABG pCO2   (35-45)  mmHG


 


ABG pO2   ()  mmHG


 


ABG HCO3   (22-26)  mEq/L


 


ABG Total CO2   (23-27)  mmol/L


 


ABG Base Excess   (-2.0-3.0)  


 


Sodium   (136-148)  mmol/L


 


Potassium   (3.5-5.1)  mmol/L


 


Chloride   ()  mmol/L


 


Carbon Dioxide   (21.0-32.0)  mmol/L


 


BUN   (7.0-18.0)  mg/dL


 


Creatinine   (0.8-1.3)  mg/dL


 


Est Cr Clr Drug Dosing   


 


Estimated GFR (MDRD)   ml/min


 


Glucose   ()  mg/dL


 


POC Glucose   (70-99)  mg/dL


 


Lactic Acid   (0.4-2.0)  mmol/L


 


Calcium   (8.5-10.1)  mg/dL


 


Magnesium   (1.8-2.4)  mg/dL


 


Total Bilirubin   (0.2-1.0)  mg/dL


 


AST   (15-37)  IU/L


 


ALT   (14-63)  IU/L


 


Alkaline Phosphatase   ()  U/L


 


Troponin I   (0.000-0.056)  ng/mL


 


Total Protein   (6.4-8.2)  g/dL


 


Albumin   (3.4-5.0)  g/dL


 


Globulin   (2.6-4.0)  g/dL


 


Albumin/Globulin Ratio   (0.9-1.6)  


 


TSH, Ultra Sensitive   (0.36-3.74)  uIU/mL


 


Urine Color   


 


Urine Appearance   


 


Urine pH   (5.0-8.0)  


 


Ur Specific Gravity   (1.001-1.035)  


 


Urine Protein   (NEGATIVE)  mg/dL


 


Urine Glucose (UA)   (NEGATIVE)  mg/dL


 


Urine Ketones   (NEGATIVE)  mg/dL


 


Urine Occult Blood   (NEGATIVE)  


 


Urine Nitrite   (NEGATIVE)  


 


Urine Bilirubin   (NEGATIVE)  


 


Urine Urobilinogen   (<2.0)  EU/dL


 


Ur Leukocyte Esterase   (NEGATIVE)  


 


Urine RBC   (0-2/HPF)  


 


Urine WBC   (0-5/HPF)  


 


Ur Epithelial Cells   (NONE-FEW)  


 


Urine Bacteria   (NEGATIVE)  


 


Salicylates   (0-20)  mg/dL


 


Urine Opiates Screen   (NEGATIVE)  


 


Ur Oxycodone Screen   (NEGATIVE)  


 


Urine Methadone Screen   (NEGATIVE)  


 


Acetaminophen   ug/mL


 


Ur Barbiturates Screen   (NEGATIVE)  


 


Ur Phencyclidine Scrn   (NEGATIVE)  


 


Ur Amphetamine Screen   (NEGATIVE)  


 


U Methamphetamines Scrn   (NEGATIVE)  


 


U Benzodiazepines Scrn   (NEGATIVE)  


 


U Cocaine Metab Screen   (NEGATIVE)  


 


U Marijuana (THC) Screen   (NEGATIVE)  


 


Ethyl Alcohol   mg/dL


 


Influenza Type A RNA   (NEGATIVE)  


 


Influenza Type B RNA   (NEGATIVE)  


 


SARS-CoV-2 RNA (GABRIEL)   (NEGATIVE)  


 


Blood Type   


 


Antibody Screen   


 


Crossmatch   











Meds: 





Medications











Generic Name Dose Route Start Last Admin





  Trade Name Freq  PRN Reason Stop Dose Admin


 


Dextrose/Water  50 ml  01/07/22 20:00 





  50% Dextrose In Water 50 Ml Syringe  IV  





  ASDIRECTED PRN  





  Hypoglycemia  


 


Dextrose/Water  50 ml  01/07/22 20:01 





  50% Dextrose In Water 50 Ml Syringe  IVPUSH  





  ASDIRECTED PRN  





  Hypoglycemia  


 


Dextrose/Water  50 ml  01/07/22 21:55 





  50% Dextrose In Water 50 Ml Syringe  IVPUSH  





  ASDIRECTED PRN  





  Hypoglycemia  


 


Glucagon  1 mg  01/07/22 20:01 





  Glucagon,Human Recombinant 1 Mg Vial  IM  





  ASDIRECTED PRN  





  Hypoglycemia  


 


Glucagon  1 mg  01/07/22 21:55 





  Glucagon,Human Recombinant 1 Mg Vial  IM  





  ASDIRECTED PRN  





  Hypoglycemia  


 


Propofol  100 mls @ 2.4 mls/hr  01/07/22 17:45  01/07/22 17:50





  Diprivan 100 Ml  IV   20 mcg/kg/min





  TITRATE CLIVE   9.6 mls/hr





    Titration





  Protocol  





  5 MCG/KG/MIN  


 


Octreotide Acetate 500 mcg/  500 mls @ 50 mls/hr  01/07/22 19:15  01/07/22 21:39





  Sodium Chloride  IV   50 mcg/hr





  Q10H CLIVE   50 mls/hr





    Administration





  50 MCG/HR  


 


Fentanyl Citrate 2,500 mcg/  250 mls @ 2.5 mls/hr  01/07/22 19:10 





  Premix  IV  





  TITRATE PRN  





  Other  





  Protocol  





  25 MCG/HR  


 


Sodium Bicarbonate 150 meq/  1,150 mls @ 200 mls/hr  01/07/22 19:30  01/07/22 

20:20





  Dextrose/Water  IV  01/08/22 00:21  200 mls/hr





  ONETIME ONE   Administration


 


Norepinephrine Bitartrate  4 mg in 250 mls @ 7.5 mls/hr  01/07/22 20:00 





  Norepinephr-0.9% Nacl 4 Mg/250  IV  





  TITRATE CLIVE  





  Protocol  





  2 MCG/MIN  


 


Vancomycin HCl 2 gm/ Premix  400 mls @ 200 mls/hr  01/07/22 21:00 





  IV  01/07/22 22:59 





  STAT ONE  


 


Lactated Ringer's  1,000 mls @ 150 mls/hr  01/07/22 21:18  01/07/22 22:05





  Ringers, Lactated  IV  01/08/22 03:57  150 mls/hr





  STAT STA   Administration


 


Sodium Chloride  10 ml  01/07/22 17:04  01/07/22 19:03





  Sodium Chloride 0.9% 10 Ml Syringe  FLUSH   10 ml





  ASDIRECTED PRN   Administration





  Keep Vein Open  


 


Sodium Chloride  2.5 ml  01/07/22 17:04  01/07/22 19:03





  Sodium Chloride 0.9% 2.5 Ml Syringe  FLUSH   2.5 ml





  ASDIRECTED PRN   Administration





  Keep Vein Open  


 


Vancomycin HCl  1 dose  01/07/22 20:04 





  Pharmacy To Dose - Vancomycin  .XX  01/07/22 20:05 





  ONETIME ONE  














Discontinued Medications














Generic Name Dose Route Start Last Admin





  Trade Name Freq  PRN Reason Stop Dose Admin


 


Albuterol  10 mg  01/07/22 20:00  01/07/22 20:59





  Albuterol 0.5% 5 Mg/Ml Neb Soln 20 Ml Bottle  NEB  01/07/22 20:01  10 mg





  ONETIME ONE   Administration


 


Albuterol  10 mg  01/07/22 21:47  01/07/22 22:05





  Albuterol 0.5% 5 Mg/Ml Neb Soln 20 Ml Bottle  NEB  01/07/22 21:48  10 mg





  ONETIME ONE   Administration


 


Calcium Gluconate  1 gm  01/07/22 20:00  01/07/22 20:16





  Calcium Gluconate 10% 1 Gm/10 Ml Sdv  IVPUSH  01/07/22 20:01  1 gm





  ONETIME ONE   Administration


 


Calcium Gluconate  1 gm  01/07/22 21:17  01/07/22 21:41





  Calcium Gluconate 10% 1 Gm/10 Ml Sdv  IV  01/07/22 21:18  1 gm





  ONETIME STA   Administration


 


Etomidate  20 mg  01/07/22 17:22  01/07/22 17:59





  Etomidate 2 Mg/Ml 20 Ml Sdv  IVPUSH  01/07/22 17:23  20 mg





  ONETIME ONE   Administration


 


Fentanyl  100 mcg  01/07/22 17:35  01/07/22 18:02





  Fentanyl 50 Mcg/Ml Sdv  IVPUSH  01/07/22 17:36  100 mcg





  ONETIME ONE   Administration


 


Fentanyl  Confirm  01/07/22 17:35  01/07/22 17:47





  Fentanyl 100 Mcg/2 Ml Sdv  Administered  01/07/22 17:36  100 mcg





  Dose   Administration





  100 mcg  





  .ROUTE  





  .STK-MED ONE  


 


Fentanyl  100 mcg  01/07/22 17:49  01/07/22 18:14





  Fentanyl 50 Mcg/Ml Sdv  IVPUSH  01/07/22 17:50  Not Given





  ONETIME ONE  


 


Fentanyl  Confirm  01/07/22 19:28  01/07/22 20:48





  Fentanyl 100 Mcg/2 Ml Sdv  Administered  01/07/22 19:29  Not Given





  Dose  





  100 mcg  





  .ROUTE  





  .STK-MED ONE  


 


Sodium Chloride  1,000 mls @ 999 mls/hr  01/07/22 17:04  01/07/22 17:46





  Normal Saline  IV  01/07/22 18:04  999 mls/hr





  STAT ONE   Administration


 


Propofol  100 mls @ 0 mls/hr  01/07/22 17:30 





  Diprivan 100 Ml  IV  





  TITRATE CLIVE  





  Protocol  





  5 MCG/KG/MIN  


 


Propofol  100 mls @ 0 mls/hr  01/07/22 17:30 





  Diprivan 100 Ml  IV  





  TITRATE CLIVE  





  Protocol  





  5 MCG/KG/MIN  


 


Propofol  100 mls @ 2.4 mls/hr  01/07/22 17:45 





  Diprivan 100 Ml  IV  





  TITRATE CLIVE  





  Protocol  





  5 MCG/KG/MIN  


 


Sodium Chloride  1,000 mls @ 999 mls/hr  01/07/22 17:49  01/07/22 18:43





  Normal Saline  IV  01/07/22 18:49  999 mls/hr





  .Bolus ONE   Administration


 


Propofol  Confirm  01/07/22 17:39  01/07/22 19:12





  Diprivan 100 Ml  Administered  01/07/22 17:40  Not Given





  Dose  





  100 mls @ as directed  





  .ROUTE  





  .STK-MED ONE  


 


Ceftriaxone Sodium 1 gm/  50 mls @ 100 mls/hr  01/07/22 18:33  01/07/22 19:03





  Sodium Chloride  IV  01/07/22 19:02  100 mls/hr





  ONETIME ONE   Administration


 


Sodium Bicarbonate 150 meq/  1,150 mls @ 200 mls/hr  01/07/22 18:37 





  Dextrose/Water  IV  01/08/22 00:21 





  ONETIME ONE  


 


Fentanyl Citrate  Confirm  01/07/22 19:35  01/07/22 21:01





  Fentanyl 2500 Mcg In Ns 250 Ml (10 Mcg/Ml)  Administered  01/07/22 19:36  Not 

Given





  Dose  





  250 mls @ as directed  





  .ROUTE  





  .STK-MED ONE  


 


Phytonadione 10 mg/ Sodium  51 mls @ 100 mls/hr  01/07/22 19:53 





  Chloride  IV  01/07/22 20:23 





  NOW ONE  


 


Tranexamic Acid 1,000 mg/  110 mls @ 660 mls/hr  01/07/22 19:56 





  Sodium Chloride  IV  01/07/22 20:05 





  ONETIME ONE  


 


Cefepime HCl 2 gm/ Premix  50 mls @ 100 mls/hr  01/07/22 20:04  01/07/22 20:50





  IV  01/07/22 20:33  100 mls/hr





  ONETIME ONE   Administration


 


Insulin Human Regular  10 unit  01/07/22 20:00  01/07/22 20:44





  Insulin Regular, Human 100 Units/Ml 10 Ml Vial  IVPUSH  01/07/22 20:01  0.1 ml





  ONETIME ONE   Administration





  Protocol  


 


Insulin Human Regular  10 unit  01/07/22 21:55  01/07/22 21:57





  Insulin Regular, Human 100 Units/Ml 10 Ml Vial  IVPUSH  01/07/22 21:56  10 

unit





  ONETIME ONE   Administration





  Protocol  


 


Octreotide Acetate  50 mcg  01/07/22 18:35 





  Octreotide 50 Mcg/1 Ml Amp  IV  01/07/22 18:36 





  ONETIME ONE  


 


Pantoprazole Sodium  80 mg  01/07/22 18:33  01/07/22 19:02





  Pantoprazole 40 Mg/10 Ml Syringe  IVPUSH  01/07/22 18:34  80 mg





  ONETIME ONE   Administration


 


Sodium Bicarbonate  50 meq  01/07/22 18:33  01/07/22 19:02





  Sodium Bicarbonate 8.4% 50 Meq/50 Ml Syringe  IVPUSH  01/07/22 18:34  50 meq





  ONETIME ONE   Administration


 


Sodium Polystyrene Sulfonate  45 gm  01/07/22 20:01 





  Sodium Polystyrene Sulfonate 15 Gm/60 Ml Susp 60 Ml Bot  PO  01/07/22 20:02 





  NOW ONE  


 


Succinylcholine Chloride  100 mg  01/07/22 17:22 





  Succinylcholine Chloride 200 Mg/10 Ml Syr  IV  01/07/22 17:23 





  ONETIME ONE  


 


Succinylcholine Chloride  100 mg  01/07/22 17:49  01/07/22 20:58





  Succinylcholine 200 Mg/10 Ml Mdv  IV  01/07/22 17:50  100 mg





  STAT STA   Administration














Departure





- Departure


Time of Disposition: 22:20


Condition: Critical





Sepsis Event Note (ED)





- Focused Exam


Vital Signs: 





                                   Vital Signs











  Temp Pulse Resp BP Pulse Ox


 


 01/07/22 18:49  36.2 C  61  26 H  98/47 L  81 L














- My Orders


Last 24 Hours: 





My Active Orders





01/07/22 18:55


FRESH FROZEN PLASMA [BBK] Stat 


RED BLOOD CELLS LP [BBK] Stat 





01/07/22 19:10


fentaNYL/Normal Saline [fentaNYL 2500 MCG in  ML (10 MCG/ML)] 2,500 mcg   

Premix Bag 1 bag IV TITRATE 





01/07/22 19:12


Transfuse Red Blood Cells [COMM] Stat 





01/07/22 19:15


Octreotide [SandoSTATIN] 500 mcg   Sodium Chloride 0.9% [Normal Saline] 495 ml 

IV Q10H 





01/07/22 19:53


Transfuse Fresh Frozen Plasma [COMM] Stat 





01/07/22 19:57


Transfuse Red Blood Cells [COMM] Stat 





01/07/22 20:00


Dextrose 50% in Water   50 ml IV ASDIRECTED PRN 


Norepinephrine Bit/0.9 % NaCl [Norepinephr-0.9% NaCl 4 mg/250] 4 mg in 250 ml IV

TITRATE 





01/07/22 20:01


RT Aerosol Therapy [RC] ASDIRECTED 


Dextrose 50% in Water   50 ml IVPUSH ASDIRECTED PRN 


Glucagon,Human Recombinant [GlucaGen]   1 mg IM ASDIRECTED PRN 





01/07/22 20:04


Pharmacy to Dose - Vancomycin   1 dose .XX ONETIME ONE 





01/07/22 21:00


VANCOmycin 2 GM/400 ML 2 gm   Premix Bag 1 bag IV STAT 





01/07/22 21:18


Lactated Ringers [Ringers, Lactated] 1,000 ml IV STAT 





01/07/22 21:47


RT Aerosol Therapy [RC] ASDIRECTED 





01/07/22 21:55


Dextrose 50% in Water   50 ml IVPUSH ASDIRECTED PRN 


Glucagon,Human Recombinant [GlucaGen]   1 mg IM ASDIRECTED PRN 





01/08/22 21:00


VANCOMYCIN TROUGH [CHEM] Timed 














- Assessment/Plan


Last 24 Hours: 





My Active Orders





01/07/22 18:55


FRESH FROZEN PLASMA [BBK] Stat 


RED BLOOD CELLS LP [BBK] Stat 





01/07/22 19:10


fentaNYL/Normal Saline [fentaNYL 2500 MCG in  ML (10 MCG/ML)] 2,500 mcg   

Premix Bag 1 bag IV TITRATE 





01/07/22 19:12


Transfuse Red Blood Cells [COMM] Stat 





01/07/22 19:15


Octreotide [SandoSTATIN] 500 mcg   Sodium Chloride 0.9% [Normal Saline] 495 ml 

IV Q10H 





01/07/22 19:53


Transfuse Fresh Frozen Plasma [COMM] Stat 





01/07/22 19:57


Transfuse Red Blood Cells [COMM] Stat 





01/07/22 20:00


Dextrose 50% in Water   50 ml IV ASDIRECTED PRN 


Norepinephrine Bit/0.9 % NaCl [Norepinephr-0.9% NaCl 4 mg/250] 4 mg in 250 ml IV

TITRATE 





01/07/22 20:01


RT Aerosol Therapy [RC] ASDIRECTED 


Dextrose 50% in Water   50 ml IVPUSH ASDIRECTED PRN 


Glucagon,Human Recombinant [GlucaGen]   1 mg IM ASDIRECTED PRN 





01/07/22 20:04


Pharmacy to Dose - Vancomycin   1 dose .XX ONETIME ONE 





01/07/22 21:00


VANCOmycin 2 GM/400 ML 2 gm   Premix Bag 1 bag IV STAT 





01/07/22 21:18


Lactated Ringers [Ringers, Lactated] 1,000 ml IV STAT 





01/07/22 21:47


RT Aerosol Therapy [RC] ASDIRECTED 





01/07/22 21:55


Dextrose 50% in Water   50 ml IVPUSH ASDIRECTED PRN 


Glucagon,Human Recombinant [GlucaGen]   1 mg IM ASDIRECTED PRN 





01/08/22 21:00


VANCOMYCIN TROUGH [CHEM] Timed

## 2022-01-07 NOTE — CR
INDICATION:



OG tube placement.



TECHNIQUE:



Single view of the upper abdomen and lower chest.



FINDINGS:



OG tube is in the stomach.



Dictated by Landon Hall MD @ 1/7/2022 6:26:03 PM



(Electronically Signed)

## 2022-01-07 NOTE — CR
INDICATION:



Post intubation.



TECHNIQUE:



Portable AP chest.



COMPARISON:



None.



FINDINGS:



The endotracheal tube tip is at the level of the thoracic inlet and located 

about 6 cm above the dioni. There are areas of atelectasis and 

consolidation in the right middle in the mid and lower right lung. Small 

benign calcified granuloma in the left lung. Heart and pulmonary 

vascularity appear normal. No pneumothorax.



Dictated by Landon Hall MD @ 1/7/2022 6:02:51 PM



(Electronically Signed)

## 2022-01-08 VITALS — DIASTOLIC BLOOD PRESSURE: 78 MMHG | SYSTOLIC BLOOD PRESSURE: 117 MMHG | HEART RATE: 81 BPM

## 2022-01-08 RX ADMIN — OCTREOTIDE ACETATE ONE: 50 INJECTION, SOLUTION INTRAVENOUS; SUBCUTANEOUS at 05:26

## 2022-01-08 NOTE — PCM.EKG
** #1 Interpretation


EKG Date: 01/07/22


Time: 19:59


Rhythm: Other (Junctional Rhythm)


Rate (Beats/Min): 57


Axis: Normal


P-Wave: Variable


QRS: Wide


ST-T: Normal


QT: Prolonged


Comparison: Change From Previous EKG (4/26/17)


EKG Interpretation Comments: 





Junctional Rhythm with wide QRS and prolong QT. 





  ** #2 Interpretation


EKG Date: 01/07/22


Time: 21:36


Rhythm: NSR


Rate (Beats/Min): 85


Axis: Normal


P-Wave: Present


QRS: Wide


ST-T: Normal


QT: Prolonged


Comparison: Change From Previous EKG (Today)


EKG Interpretation Comments: 


Sinus Rhythm with Wide QRS and prolong QT